# Patient Record
Sex: MALE | Race: AMERICAN INDIAN OR ALASKA NATIVE
[De-identification: names, ages, dates, MRNs, and addresses within clinical notes are randomized per-mention and may not be internally consistent; named-entity substitution may affect disease eponyms.]

---

## 2017-04-06 ENCOUNTER — HOSPITAL ENCOUNTER (EMERGENCY)
Dept: HOSPITAL 5 - ED | Age: 27
Discharge: HOME | End: 2017-04-06
Payer: COMMERCIAL

## 2017-04-06 VITALS — SYSTOLIC BLOOD PRESSURE: 126 MMHG | DIASTOLIC BLOOD PRESSURE: 62 MMHG

## 2017-04-06 DIAGNOSIS — J02.9: Primary | ICD-10-CM

## 2017-04-06 PROCEDURE — 99282 EMERGENCY DEPT VISIT SF MDM: CPT

## 2017-04-06 NOTE — EMERGENCY DEPARTMENT REPORT
Entered by CHELLE CLARK, acting as scribe for TITI OH PA.





ED Headache HPI





- General


Chief Complaint: Headache


Stated Complaint: HEADACHE AND SORETHROAT


Time Seen by Provider: 04/06/17 15:29


Source: patient


Exam Limitations: no limitations





- History of Present Illness


Initial Comments: 


26 year old male with no significant PMHx presents to the ED c/o intermittent 

headaches that began two weeks ago. Associated symptoms includes sore throat 

for a week, nasal congestion, but he denies nausea, vomiting, fever, and 

chills. Pt states that he took over-the-counter sinus medication with no 

relief. Rates pain a 4 out of 10 in severity.





Timing/Duration: other (2 weeks)


Quality: mild


Recent Head Trauma: no recent headache/trauma


Modifying Factors: worse with: other (over-the-counter sinus medication with no 

relief)


Associated Symptoms: nasal congestion, other (sore throat).  denies: fever/

chills, nausea/vomiting


Allergies/Adverse Reactions: 


Allergies





No Known Allergies Allergy (Verified 04/06/17 13:51)


 








Home Medications: 


Ambulatory Orders





Amoxicillin [Amoxicillin TAB] 875 mg PO BID #20 tablet 04/06/17 











ED Review of Systems


Comment: All other systems reviewed and negative


Constitutional: denies: chills, fever


ENT: throat pain (for one week), congestion


Gastrointestinal: denies: nausea, vomiting


Neurological: headache (intermittent headaches for two weeks)





ED Past Medical Hx





- Past Medical History


Previous Medical History?: No





- Surgical History


Additional Surgical History: tonsillectomy





- Social History


Smoking Status: Never Smoker


Substance Use Type: Alcohol





- Medications


Home Medications: 


 Home Medications











 Medication  Instructions  Recorded  Confirmed  Last Taken  Type


 


Amoxicillin [Amoxicillin TAB] 875 mg PO BID #20 tablet 04/06/17  Unknown Rx














ED Physical Exam





- General


Limitations: No Limitations


General appearance: alert, in no apparent distress





- Head


Head exam: Present: atraumatic, normocephalic





- Eye


Eye exam: Present: normal appearance, PERRL, EOMI





- ENT


ENT exam: Present: mucous membranes moist





- Expanded ENT Exam


  ** Expanded


Throat exam: Positive: tonsillar erythema, other (tonsillar edema)





- Neck


Neck exam: Present: normal inspection, full ROM.  Absent: meningismus, 

lymphadenopathy





- Respiratory


Respiratory exam: Present: normal lung sounds bilaterally.  Absent: respiratory 

distress, wheezes, rales, rhonchi, stridor





- Cardiovascular


Cardiovascular Exam: Present: regular rate





- GI/Abdominal


GI/Abdominal exam: Present: soft.  Absent: distended, tenderness, guarding, 

rebound, rigid





- Extremities Exam


Extremities exam: Present: normal inspection, full ROM





- Back Exam


Back exam: Present: normal inspection, full ROM





- Neurological Exam


Neurological exam: Present: alert, oriented X3





- Psychiatric


Psychiatric exam: Present: normal affect, normal mood





- Skin


Skin exam: Present: warm, dry, intact





ED Course


 Vital Signs











  04/06/17 04/06/17





  13:52 15:56


 


Temperature 98.2 F 


 


Pulse Rate 83 80


 


Respiratory 20 18





Rate  


 


Blood Pressure 130/59 


 


Blood Pressure  126/62





[Left]  


 


O2 Sat by Pulse 99 99





Oximetry  














ED Disposition


Clinical Impression: 


 Pharyngitis





Disposition: DISCHARGED TO HOME OR SELFCARE


Is pt being admited?: No


Does the pt Need Aspirin: No


Condition: Stable


Instructions:  Pharyngitis (ED)


Additional Instructions: 


Take 875 mg Amoxicillin 2x daily for 10 days. Drink lots of fluids.


Prescriptions: 


Amoxicillin [Amoxicillin TAB] 875 mg PO BID #20 tablet


Referrals: 


PRIMARY CARE,MD [Primary Care Provider] - 3-5 Days


Forms:  Work/School Release Form(ED)





This documentation as recorded by the EDUARDO greenberg JASMINE,accurately 

reflects the service I personally performed and the decisions made by me,TITI OH PA.

## 2017-04-06 NOTE — EMERGENCY DEPARTMENT REPORT
ED Headache HPI





- General


Chief Complaint: Headache


Stated Complaint: HEADACHE AND SORETHROAT


Time Seen by Provider: 04/06/17 15:29


Source: patient





- History of Present Illness


Timing/Duration: 1 week


Quality: moderate


Recent Head Trauma: occasional headaches


Associated Symptoms: fever/chills


Allergies/Adverse Reactions: 


Allergies





No Known Allergies Allergy (Verified 04/06/17 13:51)


 








Home Medications: 


Ambulatory Orders





Amoxicillin [Amoxicillin TAB] 875 mg PO BID #20 tablet 04/06/17 











ED Review of Systems


ROS: 


Stated complaint: HEADACHE AND SORETHROAT


Other details as noted in HPI


Patient complaining of sore throat fever and chills for the past one week.  

Patient denies neck pain, photophobia, shortness of breath, nausea vomiting, 

abdominal pain.  She also denies any focal neuro deficits.  But does relate 

that he has a headache that is mild in nature.


Constitutional: chills, fever, malaise


Eyes: denies: eye pain, eye discharge, vision change


ENT: throat pain


Respiratory: denies: cough, orthopnea, shortness of breath, SOB with exertion, 

SOB at rest


Cardiovascular: denies: chest pain, palpitations


Gastrointestinal: denies: abdominal pain, nausea, vomiting


Skin: denies: rash, lesions


Neurological: headache.  denies: numbness, paresthesias, confusion





ED Past Medical Hx





- Past Medical History


Previous Medical History?: No





- Surgical History


Additional Surgical History: tonsillectomy





- Social History


Smoking Status: Never Smoker


Substance Use Type: Alcohol





- Medications


Home Medications: 


 Home Medications











 Medication  Instructions  Recorded  Confirmed  Last Taken  Type


 


Amoxicillin [Amoxicillin TAB] 875 mg PO BID #20 tablet 04/06/17  Unknown Rx














ED Physical Exam





- General


Limitations: No Limitations


General appearance: alert, in no apparent distress





- Head


Head exam: Present: atraumatic, normocephalic





- Eye


Eye exam: Present: normal appearance, PERRL, EOMI





- ENT


ENT exam: Present: normal exam, mucous membranes moist, other (pharyngeal 

erythema)





- Neck


Neck exam: Present: normal inspection.  Absent: tenderness, meningismus, full 

ROM, lymphadenopathy





- Respiratory


Respiratory exam: Present: normal lung sounds bilaterally.  Absent: respiratory 

distress





- Cardiovascular


Cardiovascular Exam: Present: regular rate





- Neurological Exam


Neurological exam: Present: alert, oriented X3, CN II-XII intact





- Skin


Skin exam: Present: warm, dry, intact, normal color, rash





ED Course


 Vital Signs











  04/06/17





  13:52


 


Temperature 98.2 F


 


Pulse Rate 83


 


Respiratory 20





Rate 


 


Blood Pressure 130/59


 


O2 Sat by Pulse 99





Oximetry 











Critical care attestation.: 


If time is entered above; I have spent that time in minutes in the direct care 

of this critically ill patient, excluding procedure time.








ED Disposition


Clinical Impression: 


 Pharyngitis





Disposition: DISCHARGED TO HOME OR SELFCARE


Is pt being admited?: No


Condition: Stable


Instructions:  Pharyngitis (ED)


Additional Instructions: 


Take 875 mg Amoxicillin 2x daily for 10 days. Drink lots of fluids.


Prescriptions: 


Amoxicillin [Amoxicillin TAB] 875 mg PO BID #20 tablet


Referrals: 


PRIMARY CARE,MD [Primary Care Provider] - 3-5 Days


Forms:  Work/School Release Form(ED)

## 2018-02-06 ENCOUNTER — HOSPITAL ENCOUNTER (EMERGENCY)
Dept: HOSPITAL 5 - ED | Age: 28
Discharge: LEFT BEFORE DISCHARGE | End: 2018-02-06
Payer: COMMERCIAL

## 2018-02-06 VITALS — DIASTOLIC BLOOD PRESSURE: 85 MMHG | SYSTOLIC BLOOD PRESSURE: 131 MMHG

## 2018-02-06 DIAGNOSIS — Z53.21: ICD-10-CM

## 2018-02-06 DIAGNOSIS — R07.9: Primary | ICD-10-CM

## 2018-02-06 PROCEDURE — 93010 ELECTROCARDIOGRAM REPORT: CPT

## 2018-02-06 PROCEDURE — 93005 ELECTROCARDIOGRAM TRACING: CPT
